# Patient Record
Sex: MALE | Race: WHITE | NOT HISPANIC OR LATINO | Employment: OTHER | URBAN - METROPOLITAN AREA
[De-identification: names, ages, dates, MRNs, and addresses within clinical notes are randomized per-mention and may not be internally consistent; named-entity substitution may affect disease eponyms.]

---

## 2023-06-15 ENCOUNTER — HOSPITAL ENCOUNTER (INPATIENT)
Facility: HOSPITAL | Age: 73
LOS: 2 days | Discharge: HOME | End: 2023-06-17
Attending: INTERNAL MEDICINE | Admitting: INTERNAL MEDICINE
Payer: MEDICARE

## 2023-06-15 PROBLEM — I44.2 COMPLETE HEART BLOCK: Status: ACTIVE | Noted: 2023-06-15

## 2023-06-15 LAB
ALBUMIN SERPL-MCNC: 3.9 G/DL (ref 3.5–5.2)
ALBUMIN/GLOB SERPL: 1.3 G/DL
ALP SERPL-CCNC: 101 U/L (ref 39–117)
ALT SERPL W P-5'-P-CCNC: 10 U/L (ref 1–41)
ANION GAP SERPL CALCULATED.3IONS-SCNC: 14 MMOL/L (ref 5–15)
AST SERPL-CCNC: 13 U/L (ref 1–40)
BASOPHILS # BLD AUTO: 0.05 10*3/MM3 (ref 0–0.2)
BASOPHILS NFR BLD AUTO: 0.7 % (ref 0–1.5)
BILIRUB SERPL-MCNC: 1.2 MG/DL (ref 0–1.2)
BUN SERPL-MCNC: 19 MG/DL (ref 8–23)
BUN/CREAT SERPL: 19.6 (ref 7–25)
CALCIUM SPEC-SCNC: 9.6 MG/DL (ref 8.6–10.5)
CHLORIDE SERPL-SCNC: 104 MMOL/L (ref 98–107)
CO2 SERPL-SCNC: 21 MMOL/L (ref 22–29)
CREAT SERPL-MCNC: 0.97 MG/DL (ref 0.76–1.27)
DEPRECATED RDW RBC AUTO: 45.1 FL (ref 37–54)
EGFRCR SERPLBLD CKD-EPI 2021: 81.9 ML/MIN/1.73
EOSINOPHIL # BLD AUTO: 0.28 10*3/MM3 (ref 0–0.4)
EOSINOPHIL NFR BLD AUTO: 4 % (ref 0.3–6.2)
ERYTHROCYTE [DISTWIDTH] IN BLOOD BY AUTOMATED COUNT: 12.9 % (ref 12.3–15.4)
GLOBULIN UR ELPH-MCNC: 3 GM/DL
GLUCOSE SERPL-MCNC: 117 MG/DL (ref 65–99)
HBA1C MFR BLD: 8.8 % (ref 4.8–5.6)
HCT VFR BLD AUTO: 48.8 % (ref 37.5–51)
HGB BLD-MCNC: 16 G/DL (ref 13–17.7)
IMM GRANULOCYTES # BLD AUTO: 0.01 10*3/MM3 (ref 0–0.05)
IMM GRANULOCYTES NFR BLD AUTO: 0.1 % (ref 0–0.5)
LYMPHOCYTES # BLD AUTO: 1.52 10*3/MM3 (ref 0.7–3.1)
LYMPHOCYTES NFR BLD AUTO: 21.6 % (ref 19.6–45.3)
MAGNESIUM SERPL-MCNC: 2 MG/DL (ref 1.6–2.4)
MCH RBC QN AUTO: 31.3 PG (ref 26.6–33)
MCHC RBC AUTO-ENTMCNC: 32.8 G/DL (ref 31.5–35.7)
MCV RBC AUTO: 95.3 FL (ref 79–97)
MONOCYTES # BLD AUTO: 0.73 10*3/MM3 (ref 0.1–0.9)
MONOCYTES NFR BLD AUTO: 10.4 % (ref 5–12)
NEUTROPHILS NFR BLD AUTO: 4.45 10*3/MM3 (ref 1.7–7)
NEUTROPHILS NFR BLD AUTO: 63.2 % (ref 42.7–76)
NRBC BLD AUTO-RTO: 0 /100 WBC (ref 0–0.2)
NT-PROBNP SERPL-MCNC: 619.4 PG/ML (ref 0–900)
PHOSPHATE SERPL-MCNC: 4.2 MG/DL (ref 2.5–4.5)
PLATELET # BLD AUTO: 187 10*3/MM3 (ref 140–450)
PMV BLD AUTO: 9.8 FL (ref 6–12)
POTASSIUM SERPL-SCNC: 3.9 MMOL/L (ref 3.5–5.2)
PROT SERPL-MCNC: 6.9 G/DL (ref 6–8.5)
RBC # BLD AUTO: 5.12 10*6/MM3 (ref 4.14–5.8)
SODIUM SERPL-SCNC: 139 MMOL/L (ref 136–145)
WBC NRBC COR # BLD: 7.04 10*3/MM3 (ref 3.4–10.8)

## 2023-06-15 PROCEDURE — 93005 ELECTROCARDIOGRAM TRACING: CPT | Performed by: INTERNAL MEDICINE

## 2023-06-15 PROCEDURE — 85025 COMPLETE CBC W/AUTO DIFF WBC: CPT

## 2023-06-15 PROCEDURE — 83880 ASSAY OF NATRIURETIC PEPTIDE: CPT

## 2023-06-15 PROCEDURE — 83735 ASSAY OF MAGNESIUM: CPT

## 2023-06-15 PROCEDURE — 83036 HEMOGLOBIN GLYCOSYLATED A1C: CPT

## 2023-06-15 PROCEDURE — 80053 COMPREHEN METABOLIC PANEL: CPT

## 2023-06-15 PROCEDURE — 84100 ASSAY OF PHOSPHORUS: CPT

## 2023-06-15 RX ORDER — DIAZEPAM 2 MG/1
4 TABLET ORAL ONCE
Status: COMPLETED | OUTPATIENT
Start: 2023-06-16 | End: 2023-06-15

## 2023-06-15 RX ORDER — AMOXICILLIN 250 MG
2 CAPSULE ORAL 2 TIMES DAILY
Status: DISCONTINUED | OUTPATIENT
Start: 2023-06-16 | End: 2023-06-17 | Stop reason: HOSPADM

## 2023-06-15 RX ORDER — LEVOCETIRIZINE DIHYDROCHLORIDE 5 MG/1
5 TABLET, FILM COATED ORAL DAILY
COMMUNITY

## 2023-06-15 RX ORDER — BISACODYL 5 MG/1
5 TABLET, DELAYED RELEASE ORAL DAILY PRN
Status: DISCONTINUED | OUTPATIENT
Start: 2023-06-15 | End: 2023-06-17 | Stop reason: HOSPADM

## 2023-06-15 RX ORDER — SODIUM CHLORIDE 0.9 % (FLUSH) 0.9 %
10 SYRINGE (ML) INJECTION EVERY 12 HOURS SCHEDULED
Status: DISCONTINUED | OUTPATIENT
Start: 2023-06-15 | End: 2023-06-17 | Stop reason: HOSPADM

## 2023-06-15 RX ORDER — SODIUM CHLORIDE 9 MG/ML
40 INJECTION, SOLUTION INTRAVENOUS AS NEEDED
Status: DISCONTINUED | OUTPATIENT
Start: 2023-06-15 | End: 2023-06-17 | Stop reason: HOSPADM

## 2023-06-15 RX ORDER — ATORVASTATIN CALCIUM 20 MG/1
20 TABLET, FILM COATED ORAL NIGHTLY
Status: DISCONTINUED | OUTPATIENT
Start: 2023-06-15 | End: 2023-06-17 | Stop reason: HOSPADM

## 2023-06-15 RX ORDER — SODIUM CHLORIDE 0.9 % (FLUSH) 0.9 %
10 SYRINGE (ML) INJECTION AS NEEDED
Status: DISCONTINUED | OUTPATIENT
Start: 2023-06-15 | End: 2023-06-17 | Stop reason: HOSPADM

## 2023-06-15 RX ORDER — PANTOPRAZOLE SODIUM 40 MG/1
40 TABLET, DELAYED RELEASE ORAL DAILY
COMMUNITY

## 2023-06-15 RX ORDER — BISACODYL 10 MG
10 SUPPOSITORY, RECTAL RECTAL DAILY PRN
Status: DISCONTINUED | OUTPATIENT
Start: 2023-06-15 | End: 2023-06-17 | Stop reason: HOSPADM

## 2023-06-15 RX ORDER — DIAZEPAM 5 MG/1
5 TABLET ORAL NIGHTLY PRN
COMMUNITY
Start: 2023-06-14

## 2023-06-15 RX ORDER — POLYETHYLENE GLYCOL 3350 17 G/17G
17 POWDER, FOR SOLUTION ORAL DAILY PRN
Status: DISCONTINUED | OUTPATIENT
Start: 2023-06-15 | End: 2023-06-17 | Stop reason: HOSPADM

## 2023-06-15 RX ORDER — ASPIRIN 325 MG
325 TABLET ORAL DAILY
COMMUNITY

## 2023-06-15 RX ORDER — PANTOPRAZOLE SODIUM 40 MG/10ML
40 INJECTION, POWDER, LYOPHILIZED, FOR SOLUTION INTRAVENOUS
Status: DISCONTINUED | OUTPATIENT
Start: 2023-06-16 | End: 2023-06-17 | Stop reason: HOSPADM

## 2023-06-15 RX ORDER — AMLODIPINE BESYLATE 5 MG/1
5 TABLET ORAL DAILY
COMMUNITY
End: 2023-06-17 | Stop reason: HOSPADM

## 2023-06-15 RX ORDER — NITROGLYCERIN 0.4 MG/1
0.4 TABLET SUBLINGUAL
Status: DISCONTINUED | OUTPATIENT
Start: 2023-06-15 | End: 2023-06-15

## 2023-06-15 RX ORDER — ATORVASTATIN CALCIUM 20 MG/1
20 TABLET, FILM COATED ORAL DAILY
COMMUNITY

## 2023-06-15 RX ADMIN — ATORVASTATIN CALCIUM 20 MG: 20 TABLET, FILM COATED ORAL at 23:39

## 2023-06-15 RX ADMIN — DIAZEPAM 4 MG: 2 TABLET ORAL at 23:38

## 2023-06-15 RX ADMIN — Medication 10 ML: at 23:39

## 2023-06-15 NOTE — NURSING NOTE
ACC REVIEW REPORT: [facility]        PATIENT NAME: Won Saenz    PATIENT ID: [id number]        COVID-19 ACC SCREENING       DOES THE PATIENT HAVE A FEVER GREATER THAN OR EQUAL .4:  NO    IS THE PATIENT EXPERIENCING SHORTNESS OF BREATH:  NO    DOES THE PATIENT HAVE A COUGH:  NO    DOES THE PATIENT HAVE ANY OF THE FOLLOWING RISK FACTORS:    EXPOSURE TO SUSPECTED OR KNOWN COVID-19:  NO    RECENT TRAVEL HISTORY TO ENDEMIC AREA (DOMESTIC/LOCAL):  NO    IS THE PATIENT A HEALTHCARE WORKER:  NO    HAS THE PATIENT EXPERIENCED A LOSS OF SENSE OF TASTE OR SMELL:  NO    HAS THE PATIENT BEEN TESTED FOR COVID-19:   NO    DATE TESTED:      LAB TESTING SENT TO:            BED:  s257 /     BED TYPE:  ICU    BED GIVEN TO:  EDIN SPICER RN    TIME BED GIVEN:  14:49    TODAY'S DATE: 6/15/2023    TRANSFER DATE:  6/15/2023    ETA:      TRANSFERRING FACILITY: [facility]    TRANSFERRING FACILITY PHONE # :  [phone],    ([facility] NUMBER  [phone])    TRANSFERRING MD:  DR. NAIDA MAN    DATE/TIME REQUEST RECEIVED:  6/14/2023 @ 17:16    [facility] RN:  ABELARDO SHRESTHA RN    REPORT FROM:   EDIN SPICER RN    TIME REPORT TAKEN:  14:49    DIAGNOSIS:  COMPLETE HEART BLOCK    REASON FOR TRANSFER TO [facility]:  Pt needs a Bi-V ICD PACEMAKER and the only physician able to place this pacemaker [place].     TRANSPORTATION:  local ems    CLINICAL REASON FOR TRANSFER TO [facility]:  On 6/12 the patient woke up and noticed he felt unusually weak.  He had no chest pain or shortness of breath, just profound weakness.  He went to an urgent treatment type office and requested antibiotics.  He had V/S taken, his heart rate was in the 20's and arrangements were made for admission to the [facility].  He was found to be in complete heart block and a temporary transvenous pacer was inserted thru the Right Groin site.  The tegaderm dressing covers the insertion site, the extremity  is warm with good pulses.    ECHO shows an EF of 30%,   a MUGA Scan was performed this afternoon, a disc has been requested.     PLAN: Transfer to [facility] for placement of a Bi-V ICD.       CLINICAL INFORMATION    HEIGHT:   103.5 kg    WEIGHT:   180.34 cm    ALLERGIES:   NKDA    INFECTIOUS DISEASE:  NONE    ISOLATION:  NONE    VITAL SIGNS:   TIME:    14:49  TEMP:  97.8  PULSE:  50, Pacemaker settings: Paced Rate 50, DDD, Atrial, Output 10 Ventricular @ 5  B/P:   155/72  RESP:  20        LAB INFORMATION:  6/15/23 A.M. BUN 17,  Creatinine 1.2911:00 Glucose 159    CULTURE INFORMATION:  None    MEDS/IV FLUIDS:  IV access x 2 sites:  # 18 angio cath LEFT A/C to saline lock                                                                      # 20 angio cath RIGHT Forearm NORMAL SALINE @ 30 ml/hr    HOME MEDICATIONS:  100 Metoprolol, Amlodipine, dose unknown,       CARDIAC SYSTEM:    CHEST PAIN:  NONE    RHYTHM:  PACED    Is patient taking or has patient been given any drugs that could increase bleeding?   YES    DRUG:  LOVENOX     DOSE/FREQUENCY:      CARDIAC NOTES:  The patient has a history of HTN and CAD, resulting in cardiac stents 20 years ago.   He is also a diabetic on SSI, has Hyperlipidemia is on Statins, and Lovenox.    He has had a MUGA SCAN TODAY, A DISC HAS BEEN REQUESTED.      RESPIRATORY SYSTEM:    LUNG SOUNDS:  CLEAR    OXYGEN:  ROOM AIR    O2 SAT:  95%    RESPIRATORY STATUS:  No respiratory distress has been noted      CNS/MUSCULOSKELETAL    ALERT AND ORIENTED:    PERSON:  yes  PLACE:  yes  TIME:  yes    INJURY:  none      MATTHEW COMA SCALE:    E:   4  M:  6  V:  5      CNS/MUSCULOSKELETAL NOTES:  MR HOLGUIN IS ALERT AND ORIENTED.  PRIOR TO HIS CURRENT HOSPITALIZATION HE HAS BEEN INDEPENDENT FOR HIS ADL'S.      GI//GY      ABDOMINAL PAIN:  NO    VOMITING:   NO    DIARRHEA:  NO    NAUSEA:  NO    BOWEL SOUNDS:  ACTIVE    GI//GY NOTES:  THE PATIENT IS ON DIABETIC DIET,  GLUCOSE AT 11:00 .     DIALLO:   Pt uses urinal.     PAST MEDICAL HISTORY:   Cardiac stents 20 years ago,  HTN, CAD, Hyperlipidemia, Diabetes.            Priti Zhou RN  6/15/2023  15:24 EDT

## 2023-06-16 ENCOUNTER — APPOINTMENT (OUTPATIENT)
Dept: GENERAL RADIOLOGY | Facility: HOSPITAL | Age: 73
End: 2023-06-16
Payer: MEDICARE

## 2023-06-16 PROBLEM — I25.5 ISCHEMIC CARDIOMYOPATHY: Status: ACTIVE | Noted: 2023-06-16

## 2023-06-16 PROBLEM — Z99.89 OSA ON CPAP: Status: ACTIVE | Noted: 2023-06-16

## 2023-06-16 PROBLEM — Z95.0 CARDIAC PACEMAKER: Status: ACTIVE | Noted: 2023-06-16

## 2023-06-16 PROBLEM — I10 ESSENTIAL HYPERTENSION: Status: ACTIVE | Noted: 2023-06-16

## 2023-06-16 PROBLEM — E78.5 DYSLIPIDEMIA: Status: ACTIVE | Noted: 2023-06-16

## 2023-06-16 PROBLEM — G47.33 OSA ON CPAP: Status: ACTIVE | Noted: 2023-06-16

## 2023-06-16 PROBLEM — E11.9 TYPE 2 DIABETES MELLITUS: Status: ACTIVE | Noted: 2023-06-16

## 2023-06-16 PROBLEM — I25.10 CORONARY ARTERY DISEASE INVOLVING NATIVE CORONARY ARTERY OF NATIVE HEART WITHOUT ANGINA PECTORIS: Status: ACTIVE | Noted: 2023-06-16

## 2023-06-16 LAB
ANION GAP SERPL CALCULATED.3IONS-SCNC: 12 MMOL/L (ref 5–15)
BUN SERPL-MCNC: 18 MG/DL (ref 8–23)
BUN/CREAT SERPL: 20 (ref 7–25)
CALCIUM SPEC-SCNC: 9 MG/DL (ref 8.6–10.5)
CHLORIDE SERPL-SCNC: 105 MMOL/L (ref 98–107)
CO2 SERPL-SCNC: 21 MMOL/L (ref 22–29)
CREAT SERPL-MCNC: 0.9 MG/DL (ref 0.76–1.27)
DEPRECATED RDW RBC AUTO: 45.3 FL (ref 37–54)
EGFRCR SERPLBLD CKD-EPI 2021: 89.6 ML/MIN/1.73
ERYTHROCYTE [DISTWIDTH] IN BLOOD BY AUTOMATED COUNT: 12.8 % (ref 12.3–15.4)
GLUCOSE BLDC GLUCOMTR-MCNC: 138 MG/DL (ref 70–130)
GLUCOSE BLDC GLUCOMTR-MCNC: 146 MG/DL (ref 70–130)
GLUCOSE BLDC GLUCOMTR-MCNC: 158 MG/DL (ref 70–130)
GLUCOSE BLDC GLUCOMTR-MCNC: 183 MG/DL (ref 70–130)
GLUCOSE BLDC GLUCOMTR-MCNC: 186 MG/DL (ref 70–130)
GLUCOSE SERPL-MCNC: 158 MG/DL (ref 65–99)
HCT VFR BLD AUTO: 45.6 % (ref 37.5–51)
HGB BLD-MCNC: 15.6 G/DL (ref 13–17.7)
MAGNESIUM SERPL-MCNC: 2 MG/DL (ref 1.6–2.4)
MCH RBC QN AUTO: 32.5 PG (ref 26.6–33)
MCHC RBC AUTO-ENTMCNC: 34.2 G/DL (ref 31.5–35.7)
MCV RBC AUTO: 95 FL (ref 79–97)
PHOSPHATE SERPL-MCNC: 4.1 MG/DL (ref 2.5–4.5)
PLATELET # BLD AUTO: 176 10*3/MM3 (ref 140–450)
PMV BLD AUTO: 9.7 FL (ref 6–12)
POTASSIUM SERPL-SCNC: 4.2 MMOL/L (ref 3.5–5.2)
QT INTERVAL: 546 MS
QTC INTERVAL: 428 MS
RBC # BLD AUTO: 4.8 10*6/MM3 (ref 4.14–5.8)
SODIUM SERPL-SCNC: 138 MMOL/L (ref 136–145)
WBC NRBC COR # BLD: 6.91 10*3/MM3 (ref 3.4–10.8)

## 2023-06-16 PROCEDURE — 71045 X-RAY EXAM CHEST 1 VIEW: CPT

## 2023-06-16 PROCEDURE — 85027 COMPLETE CBC AUTOMATED: CPT | Performed by: INTERNAL MEDICINE

## 2023-06-16 PROCEDURE — C1887 CATHETER, GUIDING: HCPCS | Performed by: INTERNAL MEDICINE

## 2023-06-16 PROCEDURE — C1882 AICD, OTHER THAN SING/DUAL: HCPCS | Performed by: INTERNAL MEDICINE

## 2023-06-16 PROCEDURE — 25010000002 VANCOMYCIN 10 G RECONSTITUTED SOLUTION: Performed by: PHYSICIAN ASSISTANT

## 2023-06-16 PROCEDURE — 33249 INSJ/RPLCMT DEFIB W/LEAD(S): CPT | Performed by: INTERNAL MEDICINE

## 2023-06-16 PROCEDURE — 25010000002 MIDAZOLAM PER 1 MG: Performed by: INTERNAL MEDICINE

## 2023-06-16 PROCEDURE — C1777 LEAD, AICD, ENDO SINGLE COIL: HCPCS | Performed by: INTERNAL MEDICINE

## 2023-06-16 PROCEDURE — 33225 L VENTRIC PACING LEAD ADD-ON: CPT | Performed by: INTERNAL MEDICINE

## 2023-06-16 PROCEDURE — C1769 GUIDE WIRE: HCPCS | Performed by: INTERNAL MEDICINE

## 2023-06-16 PROCEDURE — 84100 ASSAY OF PHOSPHORUS: CPT | Performed by: INTERNAL MEDICINE

## 2023-06-16 PROCEDURE — 63710000001 INSULIN REGULAR HUMAN PER 5 UNITS: Performed by: INTERNAL MEDICINE

## 2023-06-16 PROCEDURE — 25010000002 HEPARIN (PORCINE) PER 1000 UNITS: Performed by: INTERNAL MEDICINE

## 2023-06-16 PROCEDURE — C1892 INTRO/SHEATH,FIXED,PEEL-AWAY: HCPCS | Performed by: INTERNAL MEDICINE

## 2023-06-16 PROCEDURE — 63710000001 INSULIN REGULAR HUMAN PER 5 UNITS

## 2023-06-16 PROCEDURE — C1730 CATH, EP, 19 OR FEW ELECT: HCPCS | Performed by: INTERNAL MEDICINE

## 2023-06-16 PROCEDURE — 25010000002 FENTANYL CITRATE (PF) 50 MCG/ML SOLUTION: Performed by: INTERNAL MEDICINE

## 2023-06-16 PROCEDURE — 25010000002 VANCOMYCIN 10 G RECONSTITUTED SOLUTION: Performed by: INTERNAL MEDICINE

## 2023-06-16 PROCEDURE — C1900 LEAD, CORONARY VENOUS: HCPCS | Performed by: INTERNAL MEDICINE

## 2023-06-16 PROCEDURE — 80048 BASIC METABOLIC PNL TOTAL CA: CPT | Performed by: INTERNAL MEDICINE

## 2023-06-16 PROCEDURE — 83735 ASSAY OF MAGNESIUM: CPT | Performed by: INTERNAL MEDICINE

## 2023-06-16 PROCEDURE — 25510000001 IOPAMIDOL PER 1 ML: Performed by: INTERNAL MEDICINE

## 2023-06-16 PROCEDURE — 82948 REAGENT STRIP/BLOOD GLUCOSE: CPT

## 2023-06-16 PROCEDURE — C1898 LEAD, PMKR, OTHER THAN TRANS: HCPCS | Performed by: INTERNAL MEDICINE

## 2023-06-16 DEVICE — LD QUARTET CRT VENT LNG SPACING 86CM: Type: IMPLANTABLE DEVICE | Site: HEART | Status: FUNCTIONAL

## 2023-06-16 DEVICE — IMPLANTABLE DEVICE: Type: IMPLANTABLE DEVICE | Site: HEART | Status: FUNCTIONAL

## 2023-06-16 DEVICE — LD PM TENDRIL STS 6F52CM 2088TC52: Type: IMPLANTABLE DEVICE | Site: HEART | Status: FUNCTIONAL

## 2023-06-16 DEVICE — LD DEFIB DURATA SJ4 65CM 7122Q65: Type: IMPLANTABLE DEVICE | Site: HEART | Status: FUNCTIONAL

## 2023-06-16 RX ORDER — ACETAMINOPHEN 325 MG/1
650 TABLET ORAL EVERY 6 HOURS PRN
Status: DISCONTINUED | OUTPATIENT
Start: 2023-06-16 | End: 2023-06-17 | Stop reason: HOSPADM

## 2023-06-16 RX ORDER — DEXTROSE MONOHYDRATE 25 G/50ML
25 INJECTION, SOLUTION INTRAVENOUS
Status: DISCONTINUED | OUTPATIENT
Start: 2023-06-16 | End: 2023-06-17 | Stop reason: HOSPADM

## 2023-06-16 RX ORDER — CARVEDILOL 6.25 MG/1
6.25 TABLET ORAL 2 TIMES DAILY WITH MEALS
Status: DISCONTINUED | OUTPATIENT
Start: 2023-06-16 | End: 2023-06-17 | Stop reason: HOSPADM

## 2023-06-16 RX ORDER — LABETALOL HYDROCHLORIDE 5 MG/ML
INJECTION, SOLUTION INTRAVENOUS
Status: DISCONTINUED | OUTPATIENT
Start: 2023-06-16 | End: 2023-06-16 | Stop reason: HOSPADM

## 2023-06-16 RX ORDER — IBUPROFEN 600 MG/1
1 TABLET ORAL
Status: DISCONTINUED | OUTPATIENT
Start: 2023-06-16 | End: 2023-06-17 | Stop reason: HOSPADM

## 2023-06-16 RX ORDER — HEPARIN SODIUM 5000 [USP'U]/ML
5000 INJECTION, SOLUTION INTRAVENOUS; SUBCUTANEOUS EVERY 8 HOURS SCHEDULED
Status: DISCONTINUED | OUTPATIENT
Start: 2023-06-16 | End: 2023-06-17 | Stop reason: HOSPADM

## 2023-06-16 RX ORDER — FENTANYL CITRATE 50 UG/ML
INJECTION, SOLUTION INTRAMUSCULAR; INTRAVENOUS
Status: DISCONTINUED | OUTPATIENT
Start: 2023-06-16 | End: 2023-06-16 | Stop reason: HOSPADM

## 2023-06-16 RX ORDER — NICOTINE POLACRILEX 4 MG
15 LOZENGE BUCCAL
Status: DISCONTINUED | OUTPATIENT
Start: 2023-06-16 | End: 2023-06-17 | Stop reason: HOSPADM

## 2023-06-16 RX ORDER — AMLODIPINE BESYLATE 5 MG/1
5 TABLET ORAL
Status: DISCONTINUED | OUTPATIENT
Start: 2023-06-16 | End: 2023-06-16

## 2023-06-16 RX ORDER — SODIUM CHLORIDE 9 MG/ML
250 INJECTION, SOLUTION INTRAVENOUS CONTINUOUS
Status: ACTIVE | OUTPATIENT
Start: 2023-06-16 | End: 2023-06-16

## 2023-06-16 RX ORDER — DIAZEPAM 2 MG/1
2 TABLET ORAL NIGHTLY PRN
Status: COMPLETED | OUTPATIENT
Start: 2023-06-16 | End: 2023-06-16

## 2023-06-16 RX ORDER — CARVEDILOL 6.25 MG/1
6.25 TABLET ORAL 2 TIMES DAILY WITH MEALS
Qty: 60 TABLET | Refills: 6 | Status: SHIPPED | OUTPATIENT
Start: 2023-06-16

## 2023-06-16 RX ORDER — LIDOCAINE HYDROCHLORIDE 10 MG/ML
INJECTION, SOLUTION EPIDURAL; INFILTRATION; INTRACAUDAL; PERINEURAL
Status: DISCONTINUED | OUTPATIENT
Start: 2023-06-16 | End: 2023-06-16 | Stop reason: HOSPADM

## 2023-06-16 RX ORDER — NALOXONE HCL 0.4 MG/ML
0.4 VIAL (ML) INJECTION
Status: DISCONTINUED | OUTPATIENT
Start: 2023-06-16 | End: 2023-06-17 | Stop reason: HOSPADM

## 2023-06-16 RX ORDER — MIDAZOLAM HYDROCHLORIDE 1 MG/ML
INJECTION INTRAMUSCULAR; INTRAVENOUS
Status: DISCONTINUED | OUTPATIENT
Start: 2023-06-16 | End: 2023-06-16 | Stop reason: HOSPADM

## 2023-06-16 RX ORDER — MORPHINE SULFATE 2 MG/ML
1 INJECTION, SOLUTION INTRAMUSCULAR; INTRAVENOUS EVERY 4 HOURS PRN
Status: DISCONTINUED | OUTPATIENT
Start: 2023-06-16 | End: 2023-06-17 | Stop reason: HOSPADM

## 2023-06-16 RX ADMIN — SENNOSIDES AND DOCUSATE SODIUM 2 TABLET: 50; 8.6 TABLET ORAL at 21:10

## 2023-06-16 RX ADMIN — HEPARIN SODIUM 5000 UNITS: 5000 INJECTION INTRAVENOUS; SUBCUTANEOUS at 12:07

## 2023-06-16 RX ADMIN — ATORVASTATIN CALCIUM 20 MG: 20 TABLET, FILM COATED ORAL at 21:12

## 2023-06-16 RX ADMIN — DIAZEPAM 2 MG: 2 TABLET ORAL at 21:10

## 2023-06-16 RX ADMIN — SENNOSIDES AND DOCUSATE SODIUM 2 TABLET: 50; 8.6 TABLET ORAL at 12:07

## 2023-06-16 RX ADMIN — INSULIN HUMAN 2 UNITS: 100 INJECTION, SOLUTION PARENTERAL at 17:39

## 2023-06-16 RX ADMIN — VANCOMYCIN HYDROCHLORIDE 1500 MG: 10 INJECTION, POWDER, LYOPHILIZED, FOR SOLUTION INTRAVENOUS at 21:09

## 2023-06-16 RX ADMIN — SODIUM CHLORIDE 250 ML/HR: 9 INJECTION, SOLUTION INTRAVENOUS at 10:20

## 2023-06-16 RX ADMIN — SACUBITRIL AND VALSARTAN 1 TABLET: 49; 51 TABLET, FILM COATED ORAL at 12:07

## 2023-06-16 RX ADMIN — INSULIN HUMAN 2 UNITS: 100 INJECTION, SOLUTION PARENTERAL at 05:08

## 2023-06-16 RX ADMIN — EMPAGLIFLOZIN 10 MG: 10 TABLET, FILM COATED ORAL at 12:07

## 2023-06-16 RX ADMIN — VANCOMYCIN HYDROCHLORIDE 1500 MG: 10 INJECTION, POWDER, LYOPHILIZED, FOR SOLUTION INTRAVENOUS at 07:47

## 2023-06-16 RX ADMIN — NICARDIPINE HYDROCHLORIDE 2.5 MG/HR: 25 INJECTION, SOLUTION INTRAVENOUS at 06:48

## 2023-06-16 RX ADMIN — NICARDIPINE HYDROCHLORIDE 5 MG/HR: 25 INJECTION, SOLUTION INTRAVENOUS at 23:54

## 2023-06-16 RX ADMIN — CARVEDILOL 6.25 MG: 6.25 TABLET, FILM COATED ORAL at 17:40

## 2023-06-16 RX ADMIN — AMLODIPINE BESYLATE 5 MG: 5 TABLET ORAL at 00:31

## 2023-06-16 RX ADMIN — CARVEDILOL 6.25 MG: 6.25 TABLET, FILM COATED ORAL at 10:20

## 2023-06-16 RX ADMIN — SACUBITRIL AND VALSARTAN 1 TABLET: 49; 51 TABLET, FILM COATED ORAL at 21:10

## 2023-06-16 RX ADMIN — Medication 10 ML: at 21:11

## 2023-06-16 RX ADMIN — HEPARIN SODIUM 5000 UNITS: 5000 INJECTION INTRAVENOUS; SUBCUTANEOUS at 21:10

## 2023-06-16 RX ADMIN — PANTOPRAZOLE SODIUM 40 MG: 40 INJECTION, POWDER, LYOPHILIZED, FOR SOLUTION INTRAVENOUS at 05:09

## 2023-06-16 NOTE — PROGRESS NOTES
Pulmonary/Critical Care Follow-up     LOS: 0 days   Patient Care Team:  Yuriy Guerra MD as PCP - General (Internal Medicine)    Chief Complaint: Complete heart block    Subjective     Interval History:     Won Saenz is a 72 y.o. male lifetime non-smoker with PMH T2DM, CAD s/p LHC with stent (~20 years ago), HTN, and dyslipidemia who presents to [facility] ICU on 6/15/23 via EMS as a transfer from [facility] for cardiology evaluation and placement of a Bi-V ICD.     Patient presented to OSH on 6/12/23 with increasing weakness.  He describes symptoms consistent with exertional dyspnea as well as orthopnea/PND over the previous several days.  He also reports some abdominal bloating and fluid retention.  Upon arrival, patient was found to be in second degree heart block with a rate in the 30s. His home metoprolol was held with no improvement so a temporary transvenous pacemaker was inserted. Echocardiogram was completed on 6/14/23 showing EF30-35% with moderate LV hypokinesis and mild mitral and tricuspid regurgitation. CT abdomen pelvis was also completed per the OSH sepsis protocol, which was unremarkable with the exception of a 12-x-8-mm stone in the left upper kidney pole and an implanted pain pump was identified in the left lateral flank.  The patient reports significantly increased urine output since the temporary pacemaker was placed, and reports his dyspnea has resolved.  He has had considerable difficulty maintaining bedrest and has required some Valium in order to get any sleep.    Patient is transferred to [facility] ICU for permanent pacemaker placement per Dr. Nogueira on 6/16/23.    Time spent: 5 minutes  Electronically signed by PADDY Valles, 06/15/23, 10:54 PM EDT.     I performed an independent history and physical examination. Portions of the history were obtained by PADDY and were modified by me according to my findings. The above note reflects my findings, assessment, and plan.    Clive  TETO Saunders MD      History taken from: Chart    PMH/FH/Social History were reviewed and updated appropriately in the electronic medical record.     Review of Systems:    Review of 14 systems was completed with positives and pertinent negatives noted in the subjective section.  All other systems reviewed and are negative.         Objective     Vital Signs  Temp:  [97.8 °F (36.6 °C)] 97.8 °F (36.6 °C)  Heart Rate:  [49] 49  Resp:  [16] 16  BP: (155)/(74) 155/74  No intake/output data recorded.  Body mass index is 31.79 kg/m².     IV drips:     Physical Exam:     Constitutional:   Alert, in no acute distress   Head:   Normocephalic, atraumatic   Eyes:           Lids and lashes normal, conjunctivae and sclerae normal.  PER   ENMT:  Ears appear intact with no abnormalities noted     Lips normal.     Neck:  Trachea midline, no JVD   Lungs/Resp:    Normal effort, symmetric chest rise, no crepitus, clear to      auscultation bilaterally.               Heart/CV:   Regular rhythm and normal rate, no murmur   Abdomen/GI:    Soft, nontender, nondistended   :    Right groin pacemaker in place without erythema.   Extremities/MSK:  No clubbing or cyanosis.  No edema.     Pulses:  Pulses palpable and equal bilaterally   Skin:  No bleeding, bruising or rash   Heme/Lymph:  No cervical or supraclavicular adenopathy.   Neurologic:    Psychiatric:    Moves all extremities with no obvious focal motor deficit.  Cranial nerves 2 - 12 grossly intact  Non-agitated, normal affect.    The above findings are documentation my personal physical examination from today.  Electronically signed by:  Clive Saunders MD  06/15/23  23:41 EDT      The above physical exam findings were reviewed and reflect my exam findings as of today's exam.   Electronically signed by:        Results Review:     I reviewed the patient's new clinical results.         Invalid input(s): LABALBU, PROT  Results from last 7 days   Lab Units 06/15/23  2324   WBC 10*3/mm3  7.04   HEMOGLOBIN g/dL 16.0   HEMATOCRIT % 48.8   PLATELETS 10*3/mm3 187               I reviewed the patient's new imaging including images and reports.    Medication Review:            Assessment & Plan         Complete heart block  HTN  HL  T2DM on Levemir 30 units daily.  CAD with prior stent    72 y.o. male with history of HTN, T2DM, HL, CAD with prior stent (approximately 1998), reported gradual onset of exertional dyspnea, abdominal bloating, PND and orthopnea and was evaluated at an outside hospital where he was found to be in heart block with a heart rate in the 30s.  A temporary pacemaker was placed.  He reports improvement in urine output decreased bloating and improvement in dyspnea since the pacemaker was placed.  His beta-blocker, which he has been on for several years, was held and he has remained bradycardic.  Currently he is paced at 50 bpm.  Currently asymptomatic.  Echocardiogram at outside hospital showed LVEF of 30 to 35%.  He was transferred here for consideration of biventricular ICD placement after consultation with Dr. Nogueira.    Plan:  1.  For complete heart block: Continue temporary pacer at 50 bpm.  Cardiology to see.  Monitor hemodynamics.  N.p.o. at midnight for possible biventricular ICD tomorrow.  Monitor renal function.  2.  For hypertension: Monitor.  Add antihypertensives as needed.  Avoid beta-blockers.  3.  For T2DM: Start low-dose basal insulin with correction for now.  Will likely need prandial insulin once he starts a p.o. diet.  4.  For CAD: Aspirin, statin.  Holding beta-blocker.  5.  For acute?  Chronic systolic congestive heart failure: Does not seem to be currently decompensated.  Monitor fluid balance.  Plan for BiV pacemaker defibrillator.  Cardiology to see.  6.  DVT prophylaxis: Subcutaneous heparin.    Patient is critically ill secondary to complete heart block with tenuous cardiac/hemodynamic status and has high risk of life-threatening decompensation in  condition.   As such, the patient requires continuous monitoring and frequent reassessment for consideration of adjustment in management to minimize this risk.  I personally reassessed the patient multiple times today.    Critical care time : 45 minutes spent by me personally and independently.(This excludes time spent performing separately reportable procedures and services).  Critical care time includes high complexity decision making to assess, manipulate, and support vital organ system failure in this individual who has impairment of one or more vital organ systems such that there is a high probability of imminent or life threatening deterioration in the patient’s condition.    Electronically signed by:  Clive Saunders MD  06/15/23  23:42 EDT          *. Please note that portions of this note were completed with [employer] - a voice recognition program.
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
## 2023-06-16 NOTE — PROGRESS NOTES
Won Saenz  1950  S257/1      Patient is now back in ICU post BiV PPM/ICD implantation.  Will initiate GDMT for IHDZ, EF 30% - change toprol to Coreg.  Start Entresto.  Continue Jardiance.  Repeat labs in a.m. and if wound looks okay - plan for discharge home Saturday June 17th with follow up in our office for wound check in 10 days.      Mirian Murcia PA-C  Electronically signed by OMAR Chin, 06/16/23, 10:41 AM EDT.

## 2023-06-16 NOTE — H&P
Patient seen and ICU this morning.  Data reviewed and full note to follow.  72-year-old man with ischemic cardiomyopathy, left bundle branch block and complete heart block.  Ejection fraction less than 35%.  Patient presently has a temporary transvenous pacemaker in the right femoral vein.  He should have a biventricular pacemaker/ICD and this procedure was discussed with him this morning.  He is agreeable to the risks and benefits of the procedure.  We also discussed alternatives to biventricular pacing and ICD implantation.  He understands that a simple pacemaker would be an alternative to the biventricular pacemaker/ICD but he also understands the benefits from the device.  He has opted for pacemaker/defibrillator.  We will proceed this morning      Armando Nogueira MD

## 2023-06-16 NOTE — PROGRESS NOTES
Intensivist Note     6/16/2023  Hospital Day: 1  Day of Surgery  ICU Stays Timeline                  Hospital Admission: 06/15/23 2146 - Current  ICU stays: 1        In Date/Time Event Department ICU Stay Duration     06/15/23 2146 Admission [facility] 9 hours 27 minutes                 Mr. Won Saenz, 72 y.o. male is followed for:    Complete heart block    CAD with prior stent    Ischemic cardiomyopathy with LVEF 30%    Placement of biventricular PPM/ICD 6/16/3    Essential hypertension    Dyslipidemia    Type 2 diabetes mellitus with HbA1c 8.8    RANDAL on home CPAP       SUBJECTIVE     72-year-old  white male non-smoker with PMH CAD s/p C with stent 24 years ago, hypertension, hyperlipidemia, and diabetes mellitus.  Patient presented to [facility]/12/2023 with complaints of progressive weakness, fatigue, and exertional dyspnea.  Had also noted orthopnea, PND, abdominal bloating, and edema for several days prior to admission.  Was found to be in second-degree heart block with a rate in the 30s and his home metoprolol was held.  Unfortunately he did not improve so a transvenous pacemaker was placed.  Echocardiogram was also performed revealing an LVEF of 30 to 35%.  After placement of the pacemaker patient indicated his UOP improved and dyspnea resolved.  It was felt that he needed a biventricular pacemaker so was transferred to [facility] the evening of 6/15/2023 to be evaluated by Dr. Nogueira.    Interval history: Patient's temporary pacemaker was kept at 50 bpm last evening and he had no problems.  Was also placed NPO at midnight in preparation for pacemaker placement.  Patient has already been to the Cath Lab this morning and returns now with his biventricular PPM/ICD in place.  He is quite lethargic after receiving sedation but is arousable.  He has no complaints at the present time.  I note he has obstructive apnea and his wife tells me that he has a CPAP mask at home which he uses  "about 50% of the time.  Blood pressure is presently 131/92 without the need for any pressors as noted he is pacing well with his new pacemaker.  UOP last evening was only 725 cc but BUNs/creatinine are normal at 19/0.97.  Has been afebrile since admission with a normal WBC.  Denies chest pain, purulent cough, hemoptysis, or pleurisy.  No nausea, vomiting, diarrhea and no difficulty voiding.    ROS: Per subjective, all other systems reviewed and were negative.    The patient's relevant PMH, PSH, FH, and SH were reviewed and updated in Epic as appropriate. Allergies and Medications reviewed.    OBJECTIVE     /73   Pulse 73   Temp 97.1 °F (36.2 °C)   Resp 16   Ht 180 cm (70.87\")   Wt 103 kg (227 lb 1.2 oz)   SpO2 95%   BMI 31.79 kg/m²           Flowsheet Rows      Flowsheet Row First Filed Value   Admission Height 180 cm (70.87\") Documented at 06/15/2023 2100   Admission Weight 103 kg (227 lb 1.2 oz) Documented at 06/15/2023 2100          Intake & Output (last day)         06/15 0701  06/16 0700 06/16 0701  06/17 0700    Urine (mL/kg/hr) 725     Total Output 725     Net -725                   Exam:  General Exam:  Well-developed older white male who appears his stated age.  Sleeping and in NAD  HEENT: Pupils equal and reactive. Nose and throat clear.  Neck:                          Supple, no JVD, thyromegaly, or adenopathy  Lungs: Clear anteriorly and laterally without wheezes, rales, rhonchi.  Chest:                         New PPM insertion site left upper anterior chest.  Cardiovascular: RRR without murmurs or gallops.  HR 70 bpm and paced  Abdomen: Soft nontender without organomegaly or masses.   and rectal: Deferred.  Extremities: No cyanosis clubbing edema.  Neurologic:                 Symmetric strength. No focal deficits.  Sleepy/lethargic but arousable and will follow commands      CXR 6/16/2023: Heart size normal.  Prominent upper lobe vascularity consistent with redistribution of flow but " otherwise lungs are clear without infiltrates, consolidation, atelectasis, or masses.  There is a new biventricular PPM/ICD in place.    INFUSIONS  niCARdipine, 5 mg/hr, Last Rate: Stopped (06/16/23 0736)        Results from last 7 days   Lab Units 06/16/23  1054 06/15/23  2324   WBC 10*3/mm3 6.91 7.04   HEMOGLOBIN g/dL 15.6 16.0   HEMATOCRIT % 45.6 48.8   PLATELETS 10*3/mm3 176 187     Results from last 7 days   Lab Units 06/16/23  1054 06/15/23  2315   SODIUM mmol/L 138 139   POTASSIUM mmol/L 4.2 3.9   CHLORIDE mmol/L 105 104   CO2 mmol/L 21.0* 21.0*   BUN mg/dL 18 19   CREATININE mg/dL 0.90 0.97   GLUCOSE mg/dL 158* 117*   CALCIUM mg/dL 9.0 9.6     Results from last 7 days   Lab Units 06/16/23  0250 06/15/23  2315   MAGNESIUM mg/dL 2.0 2.0   PHOSPHORUS mg/dL 4.1 4.2     Results from last 7 days   Lab Units 06/15/23  2315   ALK PHOS U/L 101   BILIRUBIN mg/dL 1.2   ALT (SGPT) U/L 10   AST (SGOT) U/L 13       No results found for: SEDRATE    Lab Results   Component Value Date    PROBNP 619.4 06/15/2023         Procalitonin Results:             COVID LABS:  Results From Last 14 Days   Lab Units 06/15/23  2315   PROBNP pg/mL 619.4          No results found for: CKTOTAL, CKMB, CKMBINDEX, TROPONINI, TROPONINT  No results found for: TSH  No results found for: LACTATE  No results found for: CORTISOL          I reviewed the patient's results, images and medication.    Assessment & Plan   ASSESSMENT        Complete heart block    CAD with prior stent    Ischemic cardiomyopathy with LVEF 30%    Placement of biventricular PPM/ICD 6/16/3    Essential hypertension    Dyslipidemia    Type 2 diabetes mellitus with HbA1c 8.8    RANDAL on home CPAP      DISCUSSION: Appears to be doing very well.  Has obvious obstructive apnea upon my entering the room and we need to make CPAP available to him while he is in the hospital.  For now should wear it until his sedation wears off and he awakens, but otherwise can just use it at  night.    Seems to be doing well with respect to his new biventricular PPM/ICD.  Does he require a cardiac catheterization at some point to evaluate his extremely low LVEF?    PLAN     1.  Observe in the ICU  2.  CPAP at night and prn  3.  Question any need for heart cath in view of the low EF or has this been done at outlying hospital?    Plan of care and goals reviewed with multidisciplinary team at daily rounds.    I discussed the patient's findings and my recommendations with family and nursing staff    Patient is critically ill due to severe ischemic cardiomyopathy and complete heart block requiring biventricular PPM/ICD.  He high risk of life-threatening decline in condition and requires continuous monitoring and frequent reassessment of condition for adjustment of management in order to lessen risk.  I visited the patient's bedside and interacted with nurse numerous times today.    Time spent Critical care 30 min (It does not include procedure time).    Electronically signed by Alfredito Pierson MD, 06/16/23, 7:13 AM EDT.   Pulmonary / Critical care medicine

## 2023-06-16 NOTE — DISCHARGE SUMMARY
[facility] - Discharge Summary    Won Saenz  1950  [id number]    06/16/23, 10:46 EDT      Chief Complaint: following for CHB s/p BiV PPM/ICD    Admit history: Won Saenz is a 73 yo CM with known HTN, HLP, DMII, CAD/prior stenting presents to outside hospital with complaints of progressive weakness, fatigue, dyspnea.  He was noted to be in complete heart block upon arrival to OSH.  He was on a beta blocker at that time but showed no improvement in rates in 30s with cessation.  A temporary transvenous pacemaker was placed and he was transferred to [facility] for  higher level of care.    Hospital course: Mr. Saenz is a 72-year-old  male with a note above history transferred for higher level care and permanent implantation of pacemaker/possible ICD.  Upon arrival he was paced by transvenous temporary pacemaker.  He was admitted to the ICU and monitored overnight by the intensivist service.  In the morning of June 16 he had a discussion with Dr. Nogueira regarding his LV function (EF 30%) and need for pacemaker with complete heart block.  It was decided that patient would benefit from a biventricular pacemaker and defibrillator and patient agreed to proceed.  He underwent successful implantation of BiV PPM/ICD ([employer]).  He will be kept overnight in the ICU for postprocedural care.  He will be initiated on GDMT for his EF of 30% with close monitoring of I's O's, daily weights, BMP in the a.m.  An x-ray will be performed later today to check lead placement and he will receive another dose of IV vancomycin.  Plan will be to discharge home in stable condition on Saturday, June 17 with follow-up in our office 7 to 10 days for wound check.  Further recommendations based on patient's status in the next 24 hours.      Review of Systems:  Pertinent positives are listed above and in physical exam.  All others have been reviewed and are negative.    atorvastatin, 20 mg, Oral, Nightly  carvedilol, 6.25  "mg, Oral, BID With Meals  empagliflozin, 10 mg, Oral, Daily  heparin (porcine), 5,000 Units, Subcutaneous, Q8H  [START ON 6/17/2023] insulin detemir, 10 Units, Subcutaneous, Daily  insulin regular, 2-9 Units, Subcutaneous, Q6H  pantoprazole, 40 mg, Intravenous, Q AM  sacubitril-valsartan, 1 tablet, Oral, Q12H  senna-docusate sodium, 2 tablet, Oral, BID  sodium chloride, 10 mL, Intravenous, Q12H  vancomycin, 15 mg/kg, Intravenous, Once        Objective:  Vitals:   height is 180 cm (70.87\") and weight is 103 kg (227 lb 1.2 oz). His temperature is 97.1 °F (36.2 °C). His blood pressure is 131/92 and his pulse is 78. His respiration is 16 and oxygen saturation is 93%.     Intake/Output Summary (Last 24 hours) at 6/16/2023 1046  Last data filed at 6/16/2023 0400  Gross per 24 hour   Intake --   Output 725 ml   Net -725 ml       Physical Exam:  General:  WN, NAD, A and O x3.  CV:  Normal S1,S2. No murmur, rub, or gallop.  Resp:  CTA Arnav, equal, nonlabored.  Abd:  Soft, + BS, no organomegaly. Nontender to palpation.  Extrem:  No edema BLE, 2+ pedal/PT pulses.            Results from last 7 days   Lab Units 06/15/23  2324   WBC 10*3/mm3 7.04   HEMOGLOBIN g/dL 16.0   HEMATOCRIT % 48.8   PLATELETS 10*3/mm3 187     Results from last 7 days   Lab Units 06/15/23  2315   SODIUM mmol/L 139   POTASSIUM mmol/L 3.9   CHLORIDE mmol/L 104   CO2 mmol/L 21.0*   BUN mg/dL 19   CREATININE mg/dL 0.97   CALCIUM mg/dL 9.6   BILIRUBIN mg/dL 1.2   ALK PHOS U/L 101   ALT (SGPT) U/L 10   AST (SGOT) U/L 13   GLUCOSE mg/dL 117*                 Results from last 7 days   Lab Units 06/15/23  2315   PROBNP pg/mL 619.4       Tele:  NSR      Discharge instructions:    Patient will be discharged home in stable condition.  He is to follow-up in our office in 7 to 10 days for a wound check, Dr. Nogueira.    Discharge medications: Aspirin 325 mg p.o. daily, Valium 5 mg nightly as needed, Xyzal 5 mg 1 p.o. daily, pantoprazole 40 mg p.o. daily, Lipitor 20 mg " nightly, Jardiance 10 mg p.o. daily, insulin 30 units subcu every morning, carvedilol 6.25 mg twice daily, Entresto 49-51 mg p.o. twice daily.  Initiation of diuretic/aldosterone antagonist will be addressed at time of follow-up as we continue to follow renal function.  Creatinine today was 0.97.    I discussed the patient's findings and my recommendations with the patient, any present family members, and the nursing staff.  Armando Nogueira MD saw and examined patient, verified hx and PE, read all radiographic studies, reviewed labs and micro data, and formulated dx, plan for treatment and all medical decision making.      Mirian Murcia PA-C  06/16/23, 10:46 EDT

## 2023-06-17 VITALS
WEIGHT: 227.07 LBS | DIASTOLIC BLOOD PRESSURE: 72 MMHG | HEART RATE: 90 BPM | OXYGEN SATURATION: 98 % | RESPIRATION RATE: 18 BRPM | BODY MASS INDEX: 31.79 KG/M2 | TEMPERATURE: 98.5 F | SYSTOLIC BLOOD PRESSURE: 97 MMHG | HEIGHT: 71 IN

## 2023-06-17 LAB
ANION GAP SERPL CALCULATED.3IONS-SCNC: 13 MMOL/L (ref 5–15)
BUN SERPL-MCNC: 16 MG/DL (ref 8–23)
BUN/CREAT SERPL: 18.8 (ref 7–25)
CALCIUM SPEC-SCNC: 9.4 MG/DL (ref 8.6–10.5)
CHLORIDE SERPL-SCNC: 102 MMOL/L (ref 98–107)
CO2 SERPL-SCNC: 21 MMOL/L (ref 22–29)
CREAT SERPL-MCNC: 0.85 MG/DL (ref 0.76–1.27)
DEPRECATED RDW RBC AUTO: 45.8 FL (ref 37–54)
EGFRCR SERPLBLD CKD-EPI 2021: 91.2 ML/MIN/1.73
ERYTHROCYTE [DISTWIDTH] IN BLOOD BY AUTOMATED COUNT: 13 % (ref 12.3–15.4)
GLUCOSE BLDC GLUCOMTR-MCNC: 224 MG/DL (ref 70–130)
GLUCOSE SERPL-MCNC: 149 MG/DL (ref 65–99)
HCT VFR BLD AUTO: 48.3 % (ref 37.5–51)
HGB BLD-MCNC: 16 G/DL (ref 13–17.7)
MCH RBC QN AUTO: 31.7 PG (ref 26.6–33)
MCHC RBC AUTO-ENTMCNC: 33.1 G/DL (ref 31.5–35.7)
MCV RBC AUTO: 95.6 FL (ref 79–97)
PLATELET # BLD AUTO: 170 10*3/MM3 (ref 140–450)
PMV BLD AUTO: 9.8 FL (ref 6–12)
POTASSIUM SERPL-SCNC: 4 MMOL/L (ref 3.5–5.2)
QT INTERVAL: 486 MS
QTC INTERVAL: 554 MS
RBC # BLD AUTO: 5.05 10*6/MM3 (ref 4.14–5.8)
SODIUM SERPL-SCNC: 136 MMOL/L (ref 136–145)
WBC NRBC COR # BLD: 10.23 10*3/MM3 (ref 3.4–10.8)

## 2023-06-17 PROCEDURE — 85027 COMPLETE CBC AUTOMATED: CPT | Performed by: INTERNAL MEDICINE

## 2023-06-17 PROCEDURE — 63710000001 INSULIN REGULAR HUMAN PER 5 UNITS: Performed by: INTERNAL MEDICINE

## 2023-06-17 PROCEDURE — 80048 BASIC METABOLIC PNL TOTAL CA: CPT | Performed by: INTERNAL MEDICINE

## 2023-06-17 PROCEDURE — 63710000001 INSULIN DETEMIR PER 5 UNITS: Performed by: INTERNAL MEDICINE

## 2023-06-17 PROCEDURE — 82948 REAGENT STRIP/BLOOD GLUCOSE: CPT

## 2023-06-17 PROCEDURE — 25010000002 HEPARIN (PORCINE) PER 1000 UNITS: Performed by: INTERNAL MEDICINE

## 2023-06-17 PROCEDURE — 93005 ELECTROCARDIOGRAM TRACING: CPT | Performed by: INTERNAL MEDICINE

## 2023-06-17 RX ORDER — ACETAMINOPHEN 325 MG/1
650 TABLET ORAL EVERY 6 HOURS PRN
Start: 2023-06-17

## 2023-06-17 RX ADMIN — PANTOPRAZOLE SODIUM 40 MG: 40 INJECTION, POWDER, LYOPHILIZED, FOR SOLUTION INTRAVENOUS at 05:52

## 2023-06-17 RX ADMIN — CARVEDILOL 6.25 MG: 6.25 TABLET, FILM COATED ORAL at 08:05

## 2023-06-17 RX ADMIN — HEPARIN SODIUM 5000 UNITS: 5000 INJECTION INTRAVENOUS; SUBCUTANEOUS at 05:54

## 2023-06-17 RX ADMIN — EMPAGLIFLOZIN 10 MG: 10 TABLET, FILM COATED ORAL at 08:05

## 2023-06-17 RX ADMIN — SACUBITRIL AND VALSARTAN 1 TABLET: 49; 51 TABLET, FILM COATED ORAL at 08:05

## 2023-06-17 RX ADMIN — INSULIN DETEMIR 10 UNITS: 100 INJECTION, SOLUTION SUBCUTANEOUS at 08:05

## 2023-06-17 RX ADMIN — NICARDIPINE HYDROCHLORIDE 2.5 MG/HR: 25 INJECTION, SOLUTION INTRAVENOUS at 05:53

## 2023-06-17 RX ADMIN — ACETAMINOPHEN 650 MG: 325 TABLET ORAL at 05:52

## 2023-06-17 RX ADMIN — INSULIN HUMAN 4 UNITS: 100 INJECTION, SOLUTION PARENTERAL at 05:53

## 2023-06-17 NOTE — PROGRESS NOTES
"[facility]   IP Progress Note   LOS: 2 days   Patient Care Team:  Yuriy Guerra MD as PCP - General (Internal Medicine)    Chief Complaint: Follow up for Heart Block    Subjective Denies Chest Pain Denies Dyspnea Eating OK Ambulating           Active Hospital Problems    Diagnosis  POA    Essential hypertension [I10]  Yes    Dyslipidemia [E78.5]  Yes    Type 2 diabetes mellitus with HbA1c 8.8 [E11.9]  Yes    CAD with prior stent [I25.10]  Yes    Ischemic cardiomyopathy with LVEF 30% [I25.5]  Yes    Placement of biventricular PPM/ICD 6/16/3 [Z95.0]  No    RANDAL on home CPAP [G47.33, Z99.89]  Not Applicable    Complete heart block [I44.2]  Yes     [employer] model CD HF a 500Q serial #[id number]. Right atrial lead model 2088 TC serial number [id number]. RV lead model #7122 Q serial number [id number]. LV lead model 1458 QL serial number [id number]. 6-16-23                 Tele: Paced    Vitals:  /74   Pulse 90   Temp 98.5 °F (36.9 °C) (Oral)   Resp 18   Ht 180 cm (70.87\")   Wt 103 kg (227 lb 1.2 oz)   SpO2 95%   BMI 31.79 kg/m²    Body mass index is 31.79 kg/m².    Intake/Output Summary (Last 24 hours) at 6/17/2023 1029  Last data filed at 6/17/2023 1000  Gross per 24 hour   Intake 740 ml   Output 1775 ml   Net -1035 ml       Physical Exam:      General: alert, no acute distress, acyanotic, well developed, well nourished   Chest: Clear Auscultation   CV: Heart regular rate and rhythm   Extremities: negative    Results Review:         Labs:  Results from last 7 days   Lab Units 06/17/23  0500   WBC 10*3/mm3 10.23   HEMOGLOBIN g/dL 16.0   HEMATOCRIT % 48.3   PLATELETS 10*3/mm3 170     Results from last 7 days   Lab Units 06/17/23  0445 06/16/23  1054 06/15/23  2315   SODIUM mmol/L 136   < > 139   POTASSIUM mmol/L 4.0   < > 3.9   CHLORIDE mmol/L 102   < > 104   CO2 mmol/L 21.0*   < > 21.0*   BUN mg/dL 16   < > 19   CREATININE mg/dL 0.85   < > 0.97   GLUCOSE mg/dL 149*   < > 117* "   CALCIUM mg/dL 9.4   < > 9.6   ALT (SGPT) U/L  --   --  10   AST (SGOT) U/L  --   --  13    < > = values in this interval not displayed.     Estimated Creatinine Clearance: 93 mL/min (by C-G formula based on SCr of 0.85 mg/dL).  Lab Results   Component Value Date    HGBA1C 8.80 (H) 06/15/2023         Results from last 7 days   Lab Units 06/15/23  2315   PROBNP pg/mL 619.4         Scheduled Meds:  atorvastatin, 20 mg, Oral, Nightly  carvedilol, 6.25 mg, Oral, BID With Meals  empagliflozin, 10 mg, Oral, Daily  heparin (porcine), 5,000 Units, Subcutaneous, Q8H  insulin detemir, 10 Units, Subcutaneous, Daily  insulin regular, 2-9 Units, Subcutaneous, Q6H  pantoprazole, 40 mg, Intravenous, Q AM  sacubitril-valsartan, 1 tablet, Oral, Q12H  senna-docusate sodium, 2 tablet, Oral, BID  sodium chloride, 10 mL, Intravenous, Q12H      Continuous Infusions:niCARdipine, 5 mg/hr, Last Rate: Stopped (06/17/23 0743)          Assessment: Plan:  CHB  Status post biventricular pacemaker/ICD, [employer]    Edition stable  Discharge to home  Follow-up with Dr. Nogueira in 10 days for wound check and in 3 months with device interrogation      Electronically signed by OMAR Malcolm, 06/17/23, 10:34 AM EDT.

## 2023-06-26 LAB
QT INTERVAL: 486 MS
QTC INTERVAL: 554 MS

## (undated) DEVICE — GW LUGE .014 182 CM

## (undated) DEVICE — 3M™ STERI-DRAPE™ INSTRUMENT POUCH 1018: Brand: STERI-DRAPE™

## (undated) DEVICE — ADHS SKIN PREMIERPRO EXOFIN TOPICAL HI/VISC .5ML

## (undated) DEVICE — LIMB HOLDER, WRIST/ANKLE: Brand: DEROYAL

## (undated) DEVICE — CATH EP 6F REPR

## (undated) DEVICE — INTRO TEAR AWAY/LVD W/SD PRT 6F 13CM

## (undated) DEVICE — CATH DIAG EXPO .045 AL2 5F 100CM

## (undated) DEVICE — ADHS LIQ MASTISOL 2/3ML

## (undated) DEVICE — 8 FOOT DISPOSABLE BI-VENTRICULAR PACING CABLE WITH SAFE CONNECT / ALLIGATOR CLIP

## (undated) DEVICE — GUIDE CATHETER: Brand: ACUITY® PRO

## (undated) DEVICE — RADIFOCUS GLIDEWIRE: Brand: GLIDEWIRE

## (undated) DEVICE — INTRO TEAR AWAY/LVD W/SD PRT 7F 13CM

## (undated) DEVICE — ARM SLING II: Brand: DEROYAL

## (undated) DEVICE — DRSNG SURG AQUACEL AG/ADVNTGE 9X15CM 3.5X6IN

## (undated) DEVICE — LEX CATH LAB MINOR: Brand: MEDLINE INDUSTRIES, INC.

## (undated) DEVICE — INTRO TEAR AWAY/LVD W/SD PRT 10F 13CM

## (undated) DEVICE — BALN PRESS WEDGE 6F 110CM